# Patient Record
Sex: MALE | Race: WHITE
[De-identification: names, ages, dates, MRNs, and addresses within clinical notes are randomized per-mention and may not be internally consistent; named-entity substitution may affect disease eponyms.]

---

## 2018-06-10 ENCOUNTER — HOSPITAL ENCOUNTER (EMERGENCY)
Dept: HOSPITAL 38 - CC.ED | Age: 58
Discharge: HOME | End: 2018-06-10
Payer: COMMERCIAL

## 2018-06-10 DIAGNOSIS — H10.32: Primary | ICD-10-CM

## 2018-06-10 NOTE — EDM.PDOC
ED HPI GENERAL MEDICAL PROBLEM





- General


Chief Complaint: Eye Problems


Stated Complaint: eye pain


Time Seen by Provider: 06/10/18 13:55


Source of Information: Reports: Patient


History Limitations: Reports: No Limitations





- History of Present Illness


INITIAL COMMENTS - FREE TEXT/NARRATIVE: 





Emmett is a 58 year old male who presents to the ED with c/o irritation to his 

left eye. He reports he was mowing lawn this morning. He thinks maybe he 

brushed up on a pine tree. He reports his eye gradually became more irritated. 

He reports a foreign body sensation. Denies any changes in visual acuity. 

Denies any other issues. 








Onset: Today


Onset Date: 06/10/18


Onset Time: 10:00


Duration: Constant


Associated Symptoms: Reports: No Other Symptoms


  ** Left Eye


Pain Score (Numeric/FACES): 3





- Related Data


 Allergies











Allergy/AdvReac Type Severity Reaction Status Date / Time


 


No Known Allergies Allergy   Verified 06/10/18 13:49











Home Meds: 


 Home Meds





. [Unable to Verify Home Med List]  06/10/18 [History]











Past Medical History


HEENT History: Reports: Hard of Hearing, Impaired Vision


Cardiovascular History: Reports: High Cholesterol, Hypertension


Musculoskeletal History: Reports: Gout


Neurological History: Reports: Seizure





- Past Surgical History


Dermatological Surgical History: Reports: Plastic Surgical Reconstruction/Repair

, Skin Graft





Social & Family History





- Family History


Family Medical History: Noncontributory





- Tobacco Use


Smoking Status *Q: Never Smoker





- Caffeine Use


Caffeine Use: Reports: Coffee, Tea





- Alcohol Use


Days Per Week of Alcohol Use: 7


Number of Drinks Per Day: 6


Total Drinks Per Week: 42





- Recreational Drug Use


Recreational Drug Use: No





ED ROS GENERAL





- Review of Systems


Review Of Systems: ROS reveals no pertinent complaints other than HPI.





ED EXAM GENERAL W FULL EYE





- Physical Exam


Exam: See Below


Exam Limited By: No Limitations


General Appearance: Alert, WD/WN, No Apparent Distress


Eye Exam: Left Eye: Conjunctival Injection, Bilateral Eye: EOMI, PERRL


Visual Acuity (L) 20/: 15


With Correction: Yes


Eyelids: Bilateral: Normal Appearance


Conjunctiva & Sclera: Left: Injected


Cornea Exam: Bilateral: Normal Appearance


Extraocular Movements: Bilateral: Intact


Pupils: Normal Accommodation


Pupillary Size: Bilateral: 3 mm


Pupillary Reaction: Bilateral: Brisk


Anterior Chamber: Bilateral: Normal Appearance


Posterior Chamber: Bilateral: Normal Funduscopic





Course





- Vital Signs


Last Recorded V/S: 





 Last Vital Signs











Temp  97.2 F   06/10/18 13:45


 


Pulse  70   06/10/18 13:45


 


Resp  20   06/10/18 13:45


 


BP      


 


Pulse Ox  94 L  06/10/18 13:45














- Re-Assessments/Exams


Free Text/Narrative Re-Assessment/Exam: 


Left eye irrigated with sterile water. No foregn body visualized. 








Departure





- Departure


Time of Disposition: 14:14


Disposition: Home, Self-Care 01


Condition: Good


Clinical Impression: 


Conjunctivitis


Qualifiers:


 Conjunctivitis type: acute Acute conjunctivitis type: unspecified Laterality: 

left Qualified Code(s): H10.32 - Unspecified acute conjunctivitis, left eye








- Discharge Information


Instructions:  How to Use Eye Drops and Eye Ointments


Additional Instructions: 


Tobradex drops to left eye Q6 hours x 5 days


Avoid rubbing or touching eye


Tylenol or ibuprofen as needed for discomfort


Follow up if symptoms worsen or do not improve